# Patient Record
Sex: FEMALE | Race: WHITE | NOT HISPANIC OR LATINO | URBAN - METROPOLITAN AREA
[De-identification: names, ages, dates, MRNs, and addresses within clinical notes are randomized per-mention and may not be internally consistent; named-entity substitution may affect disease eponyms.]

---

## 2017-08-28 ENCOUNTER — IMPORTED ENCOUNTER (OUTPATIENT)
Dept: URBAN - METROPOLITAN AREA CLINIC 9 | Facility: CLINIC | Age: 67
End: 2017-08-28

## 2017-09-19 ENCOUNTER — IMPORTED ENCOUNTER (OUTPATIENT)
Dept: URBAN - METROPOLITAN AREA CLINIC 9 | Facility: CLINIC | Age: 67
End: 2017-09-19

## 2017-09-27 PROBLEM — INACTIVE: Noted: 2019-08-01

## 2017-09-27 PROBLEM — Z98.42: Noted: 2017-12-20

## 2017-09-27 PROBLEM — Z98.41: Noted: 2017-09-27

## 2017-09-27 PROBLEM — H10.13: Noted: 2019-08-01

## 2017-09-27 NOTE — PATIENT DISCUSSION
Continue: PreserVision AREDS 2 (vit c,h-xl-xdetz-lutein-zeaxan): capsule: 010-104-14-6 mg-unit-mg-mg

## 2017-09-27 NOTE — PATIENT DISCUSSION
CATARACTS, OS - VISUALLY SIGNIFICANT. PT TO CALL WHEN READY TO PROCEED WITH SURGERY. IF VISUAL SYMPTOMS PERSIST.

## 2017-10-31 ENCOUNTER — IMPORTED ENCOUNTER (OUTPATIENT)
Dept: URBAN - METROPOLITAN AREA CLINIC 9 | Facility: CLINIC | Age: 67
End: 2017-10-31

## 2017-12-20 PROBLEM — Z98.42: Noted: 2017-12-20

## 2017-12-20 PROBLEM — Z98.41: Noted: 2017-09-27

## 2017-12-21 ENCOUNTER — IMPORTED ENCOUNTER (OUTPATIENT)
Dept: URBAN - METROPOLITAN AREA CLINIC 9 | Facility: CLINIC | Age: 67
End: 2017-12-21

## 2018-02-06 ENCOUNTER — IMPORTED ENCOUNTER (OUTPATIENT)
Dept: URBAN - METROPOLITAN AREA CLINIC 9 | Facility: CLINIC | Age: 68
End: 2018-02-06

## 2019-01-16 NOTE — PATIENT DISCUSSION
Continue: PreserVision AREDS 2 (vit c,m-wp-zedon-lutein-zeaxan): capsule: 263-505-95-8 mg-unit-mg-mg

## 2019-01-23 NOTE — PATIENT DISCUSSION
CATARACTS, OS - VISUALLY SIGNIFICANT. SCHEDULE OS_ DISCUSSED OPTION OF ___STD OS____VS _TORIC OS______. PATIENT UNDERSTANDS AND DESIRES _TORIC OS AND BE SET FOR -1.50 _____________.   DISCUSSED WILL SET OS FOR -1.50 TO MATCH OD

## 2019-01-23 NOTE — PATIENT DISCUSSION
Continue: PreserVision AREDS 2 (vit c,d-bh-kdtvd-lutein-zeaxan): capsule: 616-370-69-5 mg-unit-mg-mg

## 2019-01-23 NOTE — PATIENT DISCUSSION
Surgery Counseling: I have discussed the option of scheduling surgery versus following, as well as the risks, benefits and alternatives of cataract surgery with the patient. It was explained that the surgery is medically indicated at this time, and it can be performed at the patient's option as delaying will cause no further deterioration, therefore there is no rush and there is no harm in waiting to have surgery. It was also explained that there is no guarantee that removing the cataract will improve their vision. The patient understands and desires to proceed with cataract surgery with the implantation of an intraocular lens to improve vision for ___DRIVE SAFELY AND WATCH TV___. I have given the patient the prescribed regimen of the all-in-one drop to use before and after cataract surgery. They have elected to use the all-in-one option of Pred/Gati/Brom(prednisolone acetate,gatifloxacin,and bromfenac. Patient to administer as directed.

## 2019-03-01 NOTE — PATIENT DISCUSSION
Continue: PreserVision AREDS 2 (vit c,y-uc-welvn-lutein-zeaxan): capsule: 539-258-77-1 mg-unit-mg-mg

## 2019-03-14 NOTE — PATIENT DISCUSSION
Continue: PreserVision AREDS 2 (vit c,l-aa-dltnf-lutein-zeaxan): capsule: 153-622-89-5 mg-unit-mg-mg

## 2019-05-23 NOTE — PATIENT DISCUSSION
Continue: PreserVision AREDS 2 (vit c,m-gc-tgido-lutein-zeaxan): capsule: 525-325-96-0 mg-unit-mg-mg

## 2019-05-23 NOTE — PATIENT DISCUSSION
DIPLOPIA: OU; LONGSTANDING. CONTINUE TO MONITOR FOR NOW. PT EDUCATION. RETURN FOR FOLLOW-UP AS SCHEDULED.

## 2019-08-01 ENCOUNTER — IMPORTED ENCOUNTER (OUTPATIENT)
Dept: URBAN - METROPOLITAN AREA CLINIC 9 | Facility: CLINIC | Age: 69
End: 2019-08-01

## 2019-09-25 NOTE — PATIENT DISCUSSION
Continue: PreserVision AREDS 2 (vit c,t-kp-eoukf-lutein-zeaxan): capsule: 728-851-65-8 mg-unit-mg-mg

## 2020-03-01 NOTE — PATIENT DISCUSSION
Post-Op Instructions: The patient was instructed in the proper use of post-operative eye drop: pred-moxi-ketor in the surgical eye 3 times per day for 3 weeks, then discontinue. Call back instructions, retinal detachment and endophthalmitis precautions given. no

## 2020-03-18 NOTE — PATIENT DISCUSSION
AMD (Dry Drusen) Counseling:   I have explained the diagnosis of macular drusen. The patient was advised of the importance of annual dilated eye exams. Return for follow-up as scheduled. (0) age 40 years or less

## 2020-03-18 NOTE — PATIENT DISCUSSION
Continue: PreserVision AREDS 2 (vit c,v-ah-dutui-lutein-zeaxan): capsule: 571-287-23-6 mg-unit-mg-mg

## 2020-03-18 NOTE — PATIENT DISCUSSION
Slab Off:No Son's patient is calling. He states his mom's left leg is hurting since the surgery. He would like to speak to the nurse for advise. Please call him back at [phone].

## 2020-09-15 NOTE — PATIENT DISCUSSION
Continue: PreserVision AREDS 2 (vit c,s-nk-hggbt-lutein-zeaxan): capsule: 889-401-48-7 mg-unit-mg-mg

## 2021-10-16 ASSESSMENT — VISUAL ACUITY
OD_SC: 20/100 SN
OS_CC: 20/20 SN
OD_CC: 20/20 SN
OS_PH: 20/60 SN
OD_CC: CF 2FT SN
OS_SC: 20/25 SN
OD_CC: 20/20 - SN
OS_CC: 20/20 SN
OD_PH: 20/100 SN
OS_SC: 20/20 SN
OD_SC: 20/40 -2 SN
OS_SC: 20/100 SN
OD_CC: 20/50 SN
OS_CC: 20/25 SN
OS_CC: 20/20 SN
OD_SC: 20/50 SN
OS_SC: 20/20 SN

## 2021-10-16 ASSESSMENT — KERATOMETRY
OD_K2POWER_DIOPTERS: 42.5
OD_AXISANGLE_DEGREES: 162
OS_AXISANGLE_DEGREES: 176
OD_AXISANGLE_DEGREES: 100
OS_K1POWER_DIOPTERS: 42.25
OD_AXISANGLE2_DEGREES: 10
OD_K2POWER_DIOPTERS: 42.5
OS_AXISANGLE2_DEGREES: 86
OD_K2POWER_DIOPTERS: 42.75
OS_K1POWER_DIOPTERS: 42.5
OS_AXISANGLE2_DEGREES: 90
OD_K1POWER_DIOPTERS: 42.25
OS_K1POWER_DIOPTERS: 42.5
OD_K1POWER_DIOPTERS: 42.25
OS_AXISANGLE2_DEGREES: 90
OS_K2POWER_DIOPTERS: 42.5
OS_AXISANGLE_DEGREES: 180
OD_AXISANGLE_DEGREES: 2
OD_K1POWER_DIOPTERS: 42.25
OD_AXISANGLE2_DEGREES: 92
OD_AXISANGLE2_DEGREES: 72
OS_AXISANGLE_DEGREES: 180
OS_K2POWER_DIOPTERS: 42.5
OS_K2POWER_DIOPTERS: 42.5

## 2021-10-16 ASSESSMENT — TONOMETRY
OD_IOP_MMHG: 10
OS_IOP_MMHG: 18
OD_IOP_MMHG: 14
OS_IOP_MMHG: 9
OS_IOP_MMHG: 13
OD_IOP_MMHG: 13
OS_IOP_MMHG: 15
OD_IOP_MMHG: 16
OS_IOP_MMHG: 15

## 2021-11-30 ENCOUNTER — ESTABLISHED PATIENT (OUTPATIENT)
Dept: URBAN - METROPOLITAN AREA CLINIC 14 | Facility: CLINIC | Age: 71
End: 2021-11-30

## 2021-11-30 DIAGNOSIS — H26.493: ICD-10-CM

## 2021-11-30 PROCEDURE — 92015 DETERMINE REFRACTIVE STATE: CPT

## 2021-11-30 PROCEDURE — 92014 COMPRE OPH EXAM EST PT 1/>: CPT

## 2021-11-30 ASSESSMENT — KERATOMETRY
OS_K2POWER_DIOPTERS: 42.50
OD_AXISANGLE_DEGREES: 177
OD_AXISANGLE2_DEGREES: 87
OD_K1POWER_DIOPTERS: 42.25
OS_AXISANGLE_DEGREES: 9
OS_K1POWER_DIOPTERS: 42.25
OS_AXISANGLE2_DEGREES: 99
OD_K2POWER_DIOPTERS: 42.75

## 2021-11-30 ASSESSMENT — TONOMETRY
OS_IOP_MMHG: 15
OD_IOP_MMHG: 15

## 2021-11-30 ASSESSMENT — VISUAL ACUITY
OU_SC: J2
OS_SC: 20/30-2
OD_SC: 20/70+1

## 2022-07-01 RX ORDER — AZELASTINE 1 MG/ML
SPRAY, METERED NASAL
COMMUNITY

## 2022-07-22 ENCOUNTER — ESTABLISHED PATIENT (OUTPATIENT)
Dept: URBAN - METROPOLITAN AREA CLINIC 14 | Facility: CLINIC | Age: 72
End: 2022-07-22

## 2022-07-22 DIAGNOSIS — Z98.41: ICD-10-CM

## 2022-07-22 DIAGNOSIS — H26.493: ICD-10-CM

## 2022-07-22 DIAGNOSIS — Z98.42: ICD-10-CM

## 2022-07-22 PROCEDURE — 92014 COMPRE OPH EXAM EST PT 1/>: CPT

## 2022-07-22 ASSESSMENT — KERATOMETRY
OS_K1POWER_DIOPTERS: 42.25
OS_AXISANGLE2_DEGREES: 99
OD_AXISANGLE2_DEGREES: 87
OD_K1POWER_DIOPTERS: 42.25
OD_AXISANGLE_DEGREES: 177
OD_K2POWER_DIOPTERS: 42.75
OS_K2POWER_DIOPTERS: 42.50
OS_AXISANGLE_DEGREES: 9

## 2022-07-22 ASSESSMENT — VISUAL ACUITY
OS_SC: 20/30
OD_GLARE: 20/40
OD_BCVA: 20/20
OD_SC: 20/40
OS_BCVA: 20/20

## 2022-07-22 ASSESSMENT — TONOMETRY
OD_IOP_MMHG: 11
OS_IOP_MMHG: 10

## 2022-08-08 ENCOUNTER — ESTABLISHED PATIENT (OUTPATIENT)
Dept: URBAN - METROPOLITAN AREA CLINIC 14 | Facility: CLINIC | Age: 72
End: 2022-08-08

## 2022-08-08 DIAGNOSIS — H26.493: ICD-10-CM

## 2022-08-08 PROCEDURE — 99214 OFFICE O/P EST MOD 30 MIN: CPT

## 2022-08-08 ASSESSMENT — KERATOMETRY
OS_K1POWER_DIOPTERS: 42.25
OS_K2POWER_DIOPTERS: 42.50
OD_K2POWER_DIOPTERS: 42.75
OS_AXISANGLE_DEGREES: 9
OD_K1POWER_DIOPTERS: 42.25
OD_AXISANGLE2_DEGREES: 87
OD_AXISANGLE_DEGREES: 177
OS_AXISANGLE2_DEGREES: 99

## 2022-08-08 ASSESSMENT — TONOMETRY
OD_IOP_MMHG: 11
OS_IOP_MMHG: 10

## 2022-08-08 ASSESSMENT — VISUAL ACUITY
OD_BCVA: 20/20
OS_SC: J1+
OS_SC: 20/40
OD_GLARE: 20/40
OD_SC: 20/40
OS_BCVA: 20/20

## 2022-09-09 ENCOUNTER — ESTABLISHED PATIENT (OUTPATIENT)
Dept: URBAN - METROPOLITAN AREA CLINIC 14 | Facility: CLINIC | Age: 72
End: 2022-09-09

## 2022-09-09 DIAGNOSIS — H26.491: ICD-10-CM

## 2022-09-09 DIAGNOSIS — Z98.890: ICD-10-CM

## 2022-09-09 PROCEDURE — 99024 POSTOP FOLLOW-UP VISIT: CPT

## 2022-09-09 ASSESSMENT — VISUAL ACUITY
OD_SC: 20/40
OD_GLARE: 20/40
OS_SC: 20/20

## 2022-09-09 ASSESSMENT — TONOMETRY
OD_IOP_MMHG: 14
OS_IOP_MMHG: 13

## 2022-09-13 PROBLEM — E78.1 PURE HYPERGLYCERIDEMIA: Status: ACTIVE | Noted: 2022-09-13

## 2022-09-13 PROBLEM — R79.89 RAISED TSH LEVEL: Status: ACTIVE | Noted: 2022-09-13

## 2022-09-13 PROBLEM — M65.9 TENOSYNOVITIS: Status: ACTIVE | Noted: 2022-09-13

## 2022-09-13 PROBLEM — E78.00 PURE HYPERCHOLESTEROLEMIA: Status: ACTIVE | Noted: 2022-09-13

## 2022-09-13 PROBLEM — I10 HYPERTENSION: Status: ACTIVE | Noted: 2022-09-13

## 2022-09-13 PROBLEM — M25.319 SHOULDER JOINT INSTABILITY: Status: ACTIVE | Noted: 2022-09-13

## 2022-10-14 ENCOUNTER — ESTABLISHED PATIENT (OUTPATIENT)
Dept: URBAN - METROPOLITAN AREA CLINIC 14 | Facility: CLINIC | Age: 72
End: 2022-10-14

## 2022-10-14 DIAGNOSIS — Z98.890: ICD-10-CM

## 2022-10-14 PROCEDURE — 99024 POSTOP FOLLOW-UP VISIT: CPT

## 2022-10-14 ASSESSMENT — VISUAL ACUITY
OD_PH: 20/25
OD_SC: 20/50
OD_SC: J3
OS_SC: J1+
OS_SC: 20/20

## 2022-10-14 ASSESSMENT — TONOMETRY
OS_IOP_MMHG: 16
OD_IOP_MMHG: 18

## 2025-01-15 ENCOUNTER — COMPREHENSIVE EXAM (OUTPATIENT)
Age: 75
End: 2025-01-15

## 2025-01-15 DIAGNOSIS — H04.123: ICD-10-CM

## 2025-01-15 PROCEDURE — 92014 COMPRE OPH EXAM EST PT 1/>: CPT
